# Patient Record
Sex: MALE | Race: ASIAN | NOT HISPANIC OR LATINO | URBAN - METROPOLITAN AREA
[De-identification: names, ages, dates, MRNs, and addresses within clinical notes are randomized per-mention and may not be internally consistent; named-entity substitution may affect disease eponyms.]

---

## 2021-03-05 ENCOUNTER — EMERGENCY (EMERGENCY)
Facility: HOSPITAL | Age: 41
LOS: 1 days | Discharge: HOME | End: 2021-03-05
Attending: EMERGENCY MEDICINE | Admitting: EMERGENCY MEDICINE
Payer: SELF-PAY

## 2021-03-05 VITALS
TEMPERATURE: 98 F | HEART RATE: 65 BPM | OXYGEN SATURATION: 98 % | DIASTOLIC BLOOD PRESSURE: 83 MMHG | SYSTOLIC BLOOD PRESSURE: 123 MMHG | RESPIRATION RATE: 16 BRPM

## 2021-03-05 LAB — ETHANOL SERPL-MCNC: 317 MG/DL — HIGH

## 2021-03-05 PROCEDURE — 99284 EMERGENCY DEPT VISIT MOD MDM: CPT

## 2021-03-05 NOTE — ED PROVIDER NOTE - PROGRESS NOTE DETAILS
JENISE Berg (Resident) - social work requesting ETOH level, will obtain. JENISE Berg (Resident) - pt AAOx4, walking w/ stable gait. Will dc home w/ SW.

## 2021-03-05 NOTE — ED PROVIDER NOTE - PHYSICAL EXAMINATION
General: non-toxic, NAD, intoxicated appearing but awake and responsive to verbal  HEENT: NCAT, PERRL, no lacerations or hematomas  Cardiac: RRR, no murmurs, 2+ peripheral pulses  Chest: CTA  Abdomen: soft, non-distended, bowel sounds present, no ttp, no rebound or guarding  Extremities: no peripheral edema, calf tenderness, or leg size discrepancies  Skin: no rashes  Neuro: AAOx2 (not to time or event), 5+motor, sensory grossly intact, no tremors or asterixis  Psych: mood and affect appropriate

## 2021-03-05 NOTE — PROVIDER CONTACT NOTE (OTHER) - ASSESSMENT
40 y/o male was brought in by EMS from [facility]. He was intoxicated while caring for his 2 children aged 11 y/o son and 10 y/o daughter. The children were picked up by their Niece. I spoke with Mother and both children. They are safe with their Mother. As per Mother and children Mr. Delgadillo is not a daily drinker and similar incident never happened in the past. I called [facility], spoke with Diana JI, call ID# [id number], Call Time is 9:58 PM. Patient can go home when discharged. [facility] will contact the Mother of children for further investigation. 42 y/o male was brought in by EMS from [facility]. He was intoxicated while caring for his 2 children aged 13 y/o son and 10 y/o daughter. The children were picked up by their Niece. I spoke with Mother and both children. They are safe with their Mother. As per Mother and children Mr. Delgadillo is not a daily drinker and similar incident never happened in the past. I called [facility], spoke with Diana JI, call ID# [id number], Call Time is 9:58 PM. Patient can go home when discharged. [facility] will contact the Mother of children for further investigation. The Father does not live with children.

## 2021-03-05 NOTE — ED PROVIDER NOTE - NSFOLLOWUPCLINICS_GEN_ALL_ED_FT
[facility] General Internal Medicine  General Internal Medicine  [address] [id number]  Phone: [phone]  Fax:   Follow Up Time:

## 2021-03-05 NOTE — ED PROVIDER NOTE - OBJECTIVE STATEMENT
41yo M hx of etoh comes to ED for intoxication. Pt was found passed out today by EMS. Was with his 2 small children 9 and 12yo who are now with their mom who is their primary guardian. Pt lives with his mom. Per mom, pt has been drinking but otherwise no other medical complaints lately. Pt states he had rum, beer, and other drinks today. Denies drugs, tobacco. Also no f/c, cp, sob, abdominal pain, headache, change in vision, or neck pain.

## 2021-03-05 NOTE — ED PROVIDER NOTE - CLINICAL SUMMARY MEDICAL DECISION MAKING FREE TEXT BOX
Pt w/ hx of ETOH abuse comes to ED for same. VSS. No signs of trauma. Pt is alert and awake. Will closely monitor and assess for sobriety.

## 2021-03-05 NOTE — ED ADULT NURSE NOTE - CHIEF COMPLAINT QUOTE
Pt found in laundry mat on floor  with his 2 children under 13 yrs old.  As per EMS, a cousin picked up the children.  Daughter's number [phone].   made aware

## 2021-03-05 NOTE — ED PROVIDER NOTE - PATIENT PORTAL LINK FT
You can access the FollowMyHealth Patient Portal offered by Health system by registering at the following website: [email or web address]. By joining [employer]’s FollowMyHealth portal, you will also be able to view your health information using other applications (apps) compatible with our system.

## 2021-03-05 NOTE — ED PROVIDER NOTE - NS ED ROS FT
Constitutional: no fevers, chills  HEENT: no cough, rhinorrhea  Cardiac: no chest pain, palpitations  Respiratory: no SOB  GI: no n/v, abd pain, bloody or dark stools  : no dysuria, frequency, or hematuria  MSK: no joint pain  Skin: no rashes  Neuro: no headache, change in vision, weakness  Psych: negative  +ETOH

## 2021-03-05 NOTE — ED PROVIDER NOTE - ATTENDING CONTRIBUTION TO CARE
Attending note:   After face to face evaluation of this patient, I concur with above noted hx, pe, and care plan for this patient.  Astudillo: 40 yom brought in by EMS after vomiting and passing out in [employer] bathroom. History from 9 year old daughter and 21 year old cousin Nicole [phone]. Pt was shopping in [place] with kids when cab was called by cousin for them. She picked up kids and took them home. Initially they were alone but mother is with them now as per cousin. Pt lives with his mother Frederick [phone]. As per cousin, pt drinks occ and has no chronic medical issues. Pt arrives to ED intoxicated, arousable, uncooperative with staff but eventually consented to get undressed. Still not answering any questions, just smiling inappropriately. PERRL, NC/At no evidence of trauma, clear lungs, nml cardiac, abd soft, moving all ext well with FROM. gait not assessed yet. Will contact mother for more pt info, if only intox suspected can discharge to family or when sober.

## 2021-03-05 NOTE — ED ADULT TRIAGE NOTE - CHIEF COMPLAINT QUOTE
Pt found in laundry mat on floor  with his 2 children under 13 yrs old.  As per EMS, a cousin picked up the children Pt found in laundry mat on floor  with his 2 children under 13 yrs old.  As per EMS, a cousin picked up the children.  Daughter's number [phone].   made aware

## 2021-03-05 NOTE — ED ADULT NURSE NOTE - OBJECTIVE STATEMENT
Pt received from break coverage RN Elif Arboleda. [facility] for [facility] Pt peeing in sink in room 25. Pt not re-directable at this time. Will continue to monitor. Pt safety maintianed.

## 2021-03-05 NOTE — ED PROVIDER NOTE - NSFOLLOWUPINSTRUCTIONS_ED_ALL_ED_FT
You were seen in the Emergency Department for intoxication. You were observed and are now safe to go home. Please follow up with your normal doctors.     1) Advance activity as tolerated.   2) Continue all previously prescribed medications as directed.    3) Follow up with your primary care physician in 24-48 hours - take copies of your results.    4) Return to the Emergency Department for worsening or persistent symptoms, and/or ANY NEW OR CONCERNING SYMPTOMS.

## 2021-03-06 VITALS
OXYGEN SATURATION: 99 % | DIASTOLIC BLOOD PRESSURE: 77 MMHG | HEART RATE: 93 BPM | RESPIRATION RATE: 15 BRPM | SYSTOLIC BLOOD PRESSURE: 122 MMHG | TEMPERATURE: 98 F

## 2021-03-06 NOTE — PROVIDER CONTACT NOTE (OTHER) - BACKGROUND
Pt is d/c'ed and needs a metrocard to return home, SW provided metrocard (#[phone]) and directions. Pt in agreement with bus home. No further SW needs at this time.

## 2021-03-06 NOTE — ED ADULT NURSE REASSESSMENT NOTE - NS ED NURSE REASSESS COMMENT FT1
Received report form SHIMA Esteves, pt. A&OX4, ambulatory w/steady gait. VSS. Respirations appear even and unlabored. Will continue to monitor.

## 2021-03-06 NOTE — ED ADULT NURSE REASSESSMENT NOTE - NS ED NURSE REASSESS COMMENT FT1
Pt. dc'ed by provider. SW at bedside, provided patient with metro card. VSS. Patient ambulatory w/ steady gait, denies any complaints.

## 2021-03-08 NOTE — PROVIDER CONTACT NOTE (OTHER) - BACKGROUND
EVNUS received call to ED SW phone from assigned CPS  Clarisa [phone]. SW provided requested information.
